# Patient Record
Sex: MALE | URBAN - METROPOLITAN AREA
[De-identification: names, ages, dates, MRNs, and addresses within clinical notes are randomized per-mention and may not be internally consistent; named-entity substitution may affect disease eponyms.]

---

## 2024-03-11 ENCOUNTER — LAB (OUTPATIENT)
Dept: URBAN - METROPOLITAN AREA CLINIC 98 | Facility: CLINIC | Age: 84
End: 2024-03-11

## 2024-03-11 ENCOUNTER — OV NP (OUTPATIENT)
Dept: URBAN - METROPOLITAN AREA CLINIC 98 | Facility: CLINIC | Age: 84
End: 2024-03-11
Payer: MEDICARE

## 2024-03-11 VITALS
HEART RATE: 84 BPM | TEMPERATURE: 96.8 F | HEIGHT: 71 IN | WEIGHT: 225 LBS | SYSTOLIC BLOOD PRESSURE: 101 MMHG | BODY MASS INDEX: 31.5 KG/M2 | DIASTOLIC BLOOD PRESSURE: 63 MMHG

## 2024-03-11 DIAGNOSIS — R74.8 ELEVATED LIVER ENZYMES: ICD-10-CM

## 2024-03-11 DIAGNOSIS — N18.30 CHRONIC RENAL FAILURE, STAGE 3 (MODERATE), UNSPECIFIED WHETHER STAGE 3A OR 3B CKD: ICD-10-CM

## 2024-03-11 PROBLEM — 90688005: Status: ACTIVE | Noted: 2024-03-11

## 2024-03-11 PROCEDURE — 99204 OFFICE O/P NEW MOD 45 MIN: CPT

## 2024-03-11 NOTE — HPI-TODAY'S VISIT:
Mr. Banks is an 83 year old male patient; referred to Dr. De Paz PCP Dr. Estee Cormier; progress note will be sent following visit. Visit 3/11/24 - Here to discuss elevated LFT - 2/23/24 > ESR 53, cr 2.29, alk 494, ast 55, alt 139, Tbil 0.21, GFR 28, , , Ferritin 1257, hgb 13.7, plt 202, inr 1.0, ggt 774,  - Dr. Priyanka Banks nephrologist- 6 months ago > CKD stage 3 - 6 ounces of ETOH daily for several years - Last ETOH 4 weeks ago - Increased fatigue - Denies swelling, jaundice - Send record to Dr. Fabrizio Sepulveda.

## 2024-03-14 ENCOUNTER — US (OUTPATIENT)
Dept: URBAN - METROPOLITAN AREA CLINIC 91 | Facility: CLINIC | Age: 84
End: 2024-03-14
Payer: MEDICARE

## 2024-03-14 DIAGNOSIS — N28.1 RENAL CYST: ICD-10-CM

## 2024-03-14 DIAGNOSIS — R93.2 ABNORMAL ULTRASOUND OF LIVER: ICD-10-CM

## 2024-03-14 PROCEDURE — 76700 US EXAM ABDOM COMPLETE: CPT | Performed by: INTERNAL MEDICINE

## 2024-03-22 LAB
A/G RATIO: 1.6
ACTIN (SMOOTH MUSCLE) ANTIBODY: 7
AFP, SERUM, TUMOR MARKER: 6.2
ALBUMIN: 4.5
ALKALINE PHOSPHATASE: 176
ALT (SGPT): 35
ANA DIRECT: NEGATIVE
AST (SGOT): 18
BASO (ABSOLUTE): 0
BASOS: 0
BILIRUBIN, TOTAL: 0.5
BUN/CREATININE RATIO: 12
BUN: 21
CALCIUM: 10.2
CARBON DIOXIDE, TOTAL: 23
CHLORIDE: 104
CREATININE: 1.78
EGFR: 38
EOS (ABSOLUTE): 0.2
EOS: 2
FERRITIN, SERUM: 746
GLOBULIN, TOTAL: 2.9
GLUCOSE: 78
HBSAG SCREEN: NEGATIVE
HEMATOCRIT: 42.6
HEMATOLOGY COMMENTS:: (no result)
HEMOGLOBIN: 14.4
HEP A AB, IGM: NEGATIVE
HEP A AB, TOTAL: POSITIVE
HEP C VIRUS AB: NON REACTIVE
HEPATITIS B SURF AB QUANT: <3.1
HEREDITARY  HEMOCHROMATOSIS: (no result)
IMMATURE CELLS: (no result)
IMMATURE GRANS (ABS): 0
IMMATURE GRANULOCYTES: 0
IMMUNOGLOBULIN A, QN, SERUM: 437
IMMUNOGLOBULIN E, TOTAL: 21
IMMUNOGLOBULIN G, QN, SERUM: 1307
IMMUNOGLOBULIN M, QN, SERUM: 117
INR: 1.1
IRON BIND.CAP.(TIBC): 310
IRON SATURATION: 30
IRON: 93
LYMPHS (ABSOLUTE): 2.9
LYMPHS: 33
Lab: (no result)
MCH: 32.1
MCHC: 33.8
MCV: 95
MITOCHONDRIAL (M2) ANTIBODY: <20
MONOCYTES(ABSOLUTE): 0.9
MONOCYTES: 11
NEUTROPHILS (ABSOLUTE): 4.7
NEUTROPHILS: 54
NRBC: (no result)
PLATELETS: 417
POTASSIUM: 5.3
PROTEIN, TOTAL: 7.4
PROTHROMBIN TIME: 11
RBC: 4.49
RDW: 13
SODIUM: 142
UIBC: 217
WBC: 8.8

## 2024-03-27 ENCOUNTER — LAB (OUTPATIENT)
Dept: URBAN - METROPOLITAN AREA CLINIC 98 | Facility: CLINIC | Age: 84
End: 2024-03-27

## 2024-03-27 ENCOUNTER — OV EP (OUTPATIENT)
Dept: URBAN - METROPOLITAN AREA CLINIC 98 | Facility: CLINIC | Age: 84
End: 2024-03-27

## 2024-03-27 ENCOUNTER — OV EP (OUTPATIENT)
Dept: URBAN - METROPOLITAN AREA CLINIC 98 | Facility: CLINIC | Age: 84
End: 2024-03-27
Payer: MEDICARE

## 2024-03-27 VITALS
HEART RATE: 79 BPM | SYSTOLIC BLOOD PRESSURE: 94 MMHG | HEIGHT: 71 IN | BODY MASS INDEX: 31.78 KG/M2 | DIASTOLIC BLOOD PRESSURE: 66 MMHG | WEIGHT: 227 LBS | TEMPERATURE: 97.2 F

## 2024-03-27 DIAGNOSIS — R74.8 ELEVATED LIVER ENZYMES: ICD-10-CM

## 2024-03-27 DIAGNOSIS — K76.0 HEPATIC STEATOSIS: ICD-10-CM

## 2024-03-27 DIAGNOSIS — N18.30 CHRONIC RENAL FAILURE, STAGE 3 (MODERATE), UNSPECIFIED WHETHER STAGE 3A OR 3B CKD: ICD-10-CM

## 2024-03-27 PROBLEM — 197321007: Status: ACTIVE | Noted: 2024-03-27

## 2024-03-27 PROCEDURE — 99213 OFFICE O/P EST LOW 20 MIN: CPT

## 2024-03-27 NOTE — HPI-TODAY'S VISIT:
Mr. Banks is an 83 year old male patient; referred to Dr. De Paz PCP Dr. Estee Cormier; progress note will be sent following visit. Visit 3/11/24- Rika Becker NP - Here to discuss elevated LFT - 2/23/24 > ESR 53, cr 2.29, alk 494, ast 55, alt 139, Tbil 0.21, GFR 28, , , Ferritin 1257, hgb 13.7, plt 202, inr 1.0, ggt 774,  - Dr. Priyanka Banks nephrologist- 6 months ago > CKD stage 3 - 6 ounces of ETOH daily for several years - Last ETOH 4 weeks ago - Increased fatigue - Denies swelling, jaundice - Send record to Dr. Fabrizio Sepulveda. Visit 3/27/24- Rika Becker NP - Here to follow-up after work-up for elevated LFTs - Abdominal U/S- mild fatty liver - Labs 3/11/24 > LFT returning to normal - Labs improved since stopping supplements - Between 2/23 and 3/11- decreased ETOH in half to 1 drink per day - Denies increased swellling , jaundice - Still has fatigue- unchanged

## 2024-05-25 LAB
A/G RATIO: 1.7
ALBUMIN: 4.3
ALKALINE PHOSPHATASE: 131
ALT (SGPT): 50
AST (SGOT): 17
BASO (ABSOLUTE): 0
BASOS: 1
BILIRUBIN, TOTAL: 0.5
BUN/CREATININE RATIO: 11
BUN: 23
CALCIUM: 9.5
CARBON DIOXIDE, TOTAL: 24
CHLORIDE: 106
CREATININE: 2.16
EGFR: 30
EOS (ABSOLUTE): 0.2
EOS: 2
GGT: 309
GLOBULIN, TOTAL: 2.5
GLUCOSE: 109
HEMATOCRIT: 42.4
HEMATOLOGY COMMENTS:: (no result)
HEMOGLOBIN: 14.5
IMMATURE CELLS: (no result)
IMMATURE GRANS (ABS): 0
IMMATURE GRANULOCYTES: 0
INR: 1
LYMPHS (ABSOLUTE): 2.8
LYMPHS: 38
MCH: 31.9
MCHC: 34.2
MCV: 93
MONOCYTES(ABSOLUTE): 0.9
MONOCYTES: 12
NEUTROPHILS (ABSOLUTE): 3.4
NEUTROPHILS: 47
NRBC: (no result)
PLATELETS: 221
POTASSIUM: 4.1
PROTEIN, TOTAL: 6.8
PROTHROMBIN TIME: 10.6
RBC: 4.54
RDW: 13.9
SODIUM: 143
WBC: 7.3

## 2024-06-11 ENCOUNTER — LAB OUTSIDE AN ENCOUNTER (OUTPATIENT)
Dept: URBAN - METROPOLITAN AREA CLINIC 98 | Facility: CLINIC | Age: 84
End: 2024-06-11

## 2024-06-11 ENCOUNTER — TELEPHONE ENCOUNTER (OUTPATIENT)
Dept: URBAN - METROPOLITAN AREA CLINIC 98 | Facility: CLINIC | Age: 84
End: 2024-06-11

## 2024-06-11 NOTE — HPI-TODAY'S VISIT:
Interpretation:  - FibroScan result estimated to be stage F2-F3 hepatic fibrosis* ; high variability in readings IQR/Med 23% would clinically correlate for validity  - severe hepatic steatosis is present based on Controlled Attenuation Parameter (CAP) reading   Plan:  - Follow up with the ordering physician as planned for plans  - Results e-faxed to the referring/ordering physician(s)   -------------------------------------------------------------------------------------------------------------------  Indication: 83 y.o. male with chronic liver disease due to chronic non-alcoholic fatty liver disease for the noninvasive assessment of liver fibrosis utilizing FibroScan transient elastography.     Exam Details:  The patient was brought to the procedure room after cleaning/disinfection of the FibroScan Vibration Controlled Transient Elastography (VCTE) equipment per protocol.  The patient fasted for > 3 hours prior to the procedure.  The test was explained fully to the patient and there was an opportunity to ask questions.  The patient elected to proceed.  Patient identification was verified and entered into the Fibroscan software.  The patient was placed in the supine position with the right arm in maximum abduction to allow optimal exposure to the right lateral rib cage area.  Fibroscan was performed per protocol using the M or XL probe (see scanned report).   A series of at least ten successful 50 mHz mechanical shear wave pulses were performed.  The patient tolerated the test well and was discharged without incident.  The printed Fibroscan results were interpreted by me and signed and scanned into the electronic medical record.    Results:  - median liver stiffness of 9.2 kPa consistent with F2-F3 stage hepatic fibrosis out of F4 (F4 is consistent with liver cirrhosis)*  - IQR/med was 23% (within expected range of <30%) with a >60% success rate  - Controlled Attenuation Parameter (CAP) was 319 dB/m which is consistent with severe hepatic steatosis  - CAP < 215 dB/m = no hepatic steatosis  - -300 = mild to moderate hepatic steatosis  - CAP > 300 dB/m = severe hepatic steatosis  - see Fibroscan result scanned into EMR (media tab) for full detailed results

## 2024-06-17 ENCOUNTER — OFFICE VISIT (OUTPATIENT)
Dept: URBAN - METROPOLITAN AREA CLINIC 98 | Facility: CLINIC | Age: 84
End: 2024-06-17

## 2024-06-17 ENCOUNTER — DASHBOARD ENCOUNTERS (OUTPATIENT)
Age: 84
End: 2024-06-17

## 2024-06-17 VITALS
TEMPERATURE: 97.2 F | HEART RATE: 66 BPM | HEIGHT: 71 IN | SYSTOLIC BLOOD PRESSURE: 120 MMHG | WEIGHT: 231.2 LBS | DIASTOLIC BLOOD PRESSURE: 69 MMHG | BODY MASS INDEX: 32.37 KG/M2

## 2024-06-17 NOTE — HPI-OTHER HISTORIES
Abdominal ultrasound 3/14/24 > Mild hepatic steatosis.   Interpretation: - FibroScan result estimated to be stage F2-F3 hepatic fibrosis* ; high variability in readings IQR/Med 23% would clinically correlate for validity - severe hepatic steatosis is present based on Controlled Attenuation Parameter (CAP) reading Plan: - Follow up with the ordering physician as planned for plans - Results e-faxed to the referring/ordering physician(s) ------------------------------------------------------------------------------------------------------------------- Indication: 83 y.o. male with chronic liver disease due to chronic non-alcoholic fatty liver disease for the noninvasive assessment of liver fibrosis utilizing FibroScan transient elastography. Exam Details: The patient was brought to the procedure room after cleaning/disinfection of the FibroScan Vibration Controlled Transient Elastography (VCTE) equipment per protocol. The patient fasted for > 3 hours prior to the procedure. The test was explained fully to the patient and there was an opportunity to ask questions. The patient elected to proceed. Patient identification was verified and entered into the Fibroscan software. The patient was placed in the supine position with the right arm in maximum abduction to allow optimal exposure to the right lateral rib cage area. Fibroscan was performed per protocol using the M or XL probe (see scanned report). A series of at least ten successful 50 mHz mechanical shear wave pulses were performed. The patient tolerated the test well and was discharged without incident. The printed Fibroscan results were interpreted by me and signed and scanned into the electronic medical record. Results: - median liver stiffness of 9.2 kPa consistent with F2-F3 stage hepatic fibrosis out of F4 (F4 is consistent with liver cirrhosis)* - IQR/med was 23% (within expected range of <30%) with a >60% success rate - Controlled Attenuation Parameter (CAP) was 319 dB/m which is consistent with severe hepatic steatosis - CAP < 215 dB/m = no hepatic steatosis - -300 = mild to moderate hepatic steatosis - CAP > 300 dB/m = severe hepatic steatosis - see Fibroscan result scanned into EMR (media tab) for full detailed results

## 2024-06-17 NOTE — HPI-TODAY'S VISIT:
Mr. Banks is an 83 year old male patient; referred to Dr. De Paz PCP Dr. Estee Cormier; progress note will be sent following visit. Visit 3/11/24- Rika Becker NP - Here to discuss elevated LFT - 2/23/24 > ESR 53, cr 2.29, alk 494, ast 55, alt 139, Tbil 0.21, GFR 28, , , Ferritin 1257, hgb 13.7, plt 202, inr 1.0, ggt 774,  - Dr. Priyanka Banks nephrologist- 6 months ago > CKD stage 3 - 6 ounces of ETOH daily for several years - Last ETOH 4 weeks ago - Increased fatigue - Denies swelling, jaundice - Send record to Dr. Fabrizio Sepulveda. Visit 3/27/24- Rika Becker NP - Here to follow-up after work-up for elevated LFTs - Abdominal U/S- mild fatty liver - Labs 3/11/24 > LFT returning to normal - Labs improved since stopping supplements - Between 2/23 and 3/11- decreased ETOH in half to 1 drink per day - Denies increased swellling , jaundice - Still has fatigue- unchanged 6/17/24- Rika Becker NP - Here to follow-up elevated LFT - ETOH 2 drinks per day - Labs improving - Stopped all supplements - Last visit nephrologist - no concerns

## 2024-09-02 ENCOUNTER — LAB OUTSIDE AN ENCOUNTER (OUTPATIENT)
Dept: URBAN - METROPOLITAN AREA CLINIC 98 | Facility: CLINIC | Age: 84
End: 2024-09-02

## 2024-11-18 ENCOUNTER — OFFICE VISIT (OUTPATIENT)
Dept: URBAN - METROPOLITAN AREA CLINIC 98 | Facility: CLINIC | Age: 84
End: 2024-11-18
Payer: MEDICARE

## 2024-11-18 DIAGNOSIS — N18.30 CHRONIC RENAL FAILURE, STAGE 3 (MODERATE), UNSPECIFIED WHETHER STAGE 3A OR 3B CKD: ICD-10-CM

## 2024-11-18 DIAGNOSIS — K76.0 HEPATIC STEATOSIS: ICD-10-CM

## 2024-11-18 DIAGNOSIS — R74.8 ELEVATED LIVER ENZYMES: ICD-10-CM

## 2024-11-18 PROCEDURE — 99213 OFFICE O/P EST LOW 20 MIN: CPT

## 2024-11-18 NOTE — HPI-TODAY'S VISIT:
Visit 3/11/24- Rika Becker NP Mr. Banks is an 83 year old male patient; referred to Dr. De Paz PCP Dr. Estee Cormier; progress note will be sent following visit. - Here to discuss elevated LFT - 2/23/24 > ESR 53, cr 2.29, alk 494, ast 55, alt 139, Tbil 0.21, GFR 28, , , Ferritin 1257, hgb 13.7, plt 202, inr 1.0, ggt 774,  - Dr. Priyanka Banks nephrologist- 6 months ago > CKD stage 3 - 6 ounces of ETOH daily for several years - Last ETOH 4 weeks ago - Increased fatigue - Denies swelling, jaundice - Send record to Dr. Fabrizio Sepulveda. Visit 3/27/24- Rika Becker NP - Here to follow-up after work-up for elevated LFTs - Abdominal U/S- mild fatty liver - Labs 3/11/24 > LFT returning to normal - Labs improved since stopping supplements - Between 2/23 and 3/11- decreased ETOH in half to 1 drink per day - Denies increased swellling , jaundice - Still has fatigue- unchanged 6/17/24- Rika Becker NP - Here to follow-up elevated LFT - Wife here for OV today - ETOH 2 drinks per day - Labs improving - Stopped all supplements - Last visit nephrologist - no concerns 11/18/24- Rika Becker NP - 85 yo male here to follow-up fatty liver - spouse here for OV Navi - Has decreased etoh 1 every other day - Labs improved with decreased etoh - Has appt. with Dr. Priyanka Banks, nephrologist 12/2024 to discuss kidney disease - Hepatitis panel- negative; autoimmune markers- normal -FibroScan F2-F3- continue to decrease ETOH, no supplements or NSAIDs

## 2024-11-18 NOTE — HPI-OTHER HISTORIES
Abdominal ultrasound 3/14/24 > Mild hepatic steatosis. 6/7/2024 FIBROSCAN   Interpretation: - FibroScan result estimated to be stage F2-F3 hepatic fibrosis* ; high variability in readings IQR/Med 23% would clinically correlate for validity - severe hepatic steatosis is present based on Controlled Attenuation Parameter (CAP) reading Plan: - Follow up with the ordering physician as planned for plans - Results e-faxed to the referring/ordering physician(s) ------------------------------------------------------------------------------------------------------------------- Indication: 83 y.o. male with chronic liver disease due to chronic non-alcoholic fatty liver disease for the noninvasive assessment of liver fibrosis utilizing FibroScan transient elastography. Exam Details: The patient was brought to the procedure room after cleaning/disinfection of the FibroScan Vibration Controlled Transient Elastography (VCTE) equipment per protocol. The patient fasted for > 3 hours prior to the procedure. The test was explained fully to the patient and there was an opportunity to ask questions. The patient elected to proceed. Patient identification was verified and entered into the Fibroscan software. The patient was placed in the supine position with the right arm in maximum abduction to allow optimal exposure to the right lateral rib cage area. Fibroscan was performed per protocol using the M or XL probe (see scanned report). A series of at least ten successful 50 mHz mechanical shear wave pulses were performed. The patient tolerated the test well and was discharged without incident. The printed Fibroscan results were interpreted by me and signed and scanned into the electronic medical record. Results: - median liver stiffness of 9.2 kPa consistent with F2-F3 stage hepatic fibrosis out of F4 (F4 is consistent with liver cirrhosis)* - IQR/med was 23% (within expected range of <30%) with a >60% success rate - Controlled Attenuation Parameter (CAP) was 319 dB/m which is consistent with severe hepatic steatosis - CAP < 215 dB/m = no hepatic steatosis - -300 = mild to moderate hepatic steatosis - CAP > 300 dB/m = severe hepatic steatosis - see Fibroscan result scanned into EMR (media tab) for full detailed results

## 2024-11-26 ENCOUNTER — TELEPHONE ENCOUNTER (OUTPATIENT)
Dept: URBAN - METROPOLITAN AREA CLINIC 23 | Facility: CLINIC | Age: 84
End: 2024-11-26

## 2025-04-07 ENCOUNTER — LAB OUTSIDE AN ENCOUNTER (OUTPATIENT)
Dept: URBAN - METROPOLITAN AREA CLINIC 98 | Facility: CLINIC | Age: 85
End: 2025-04-07

## 2025-05-20 ENCOUNTER — OFFICE VISIT (OUTPATIENT)
Dept: URBAN - METROPOLITAN AREA CLINIC 98 | Facility: CLINIC | Age: 85
End: 2025-05-20

## 2025-05-20 NOTE — HPI-TODAY'S VISIT:
Visit 3/11/24- Rika Becker NP Mr. Banks is an 83 year old male patient; referred to Dr. De Paz PCP Dr. Estee Cormier; progress note will be sent following visit. - Here to discuss elevated LFT - 2/23/24 > ESR 53, cr 2.29, alk 494, ast 55, alt 139, Tbil 0.21, GFR 28, , , Ferritin 1257, hgb 13.7, plt 202, inr 1.0, ggt 774,  - Dr. Priyanka Banks nephrologist- 6 months ago > CKD stage 3 - 6 ounces of ETOH daily for several years - Last ETOH 4 weeks ago - Increased fatigue - Denies swelling, jaundice - Send record to Dr. Fabrizio Sepulveda. Visit 3/27/24- iRka Becker NP - Here to follow-up after work-up for elevated LFTs - Abdominal U/S- mild fatty liver - Labs 3/11/24 > LFT returning to normal - Labs improved since stopping supplements - Between 2/23 and 3/11- decreased ETOH in half to 1 drink per day - Denies increased swellling , jaundice - Still has fatigue- unchanged 6/17/24- Rika Becker NP - Here to follow-up elevated LFT - Wife here for OV today - ETOH 2 drinks per day - Labs improving - Stopped all supplements - Last visit nephrologist - no concerns 11/18/24- Rika Becker NP - 85 yo male here to follow-up fatty liver - spouse here for OV Mitul - Has decreased etoh 1 every other day - Labs improved with decreased etoh - Has appt. with Dr. Priyanka Banks, nephrologist 12/2024 to discuss kidney disease - Hepatitis panel- negative; autoimmune markers- normal -FibroScan F2-F3- continue to decrease ETOH, no supplements or NSAIDs  5/20/25- Rika Becker NP - 85 yo male here for scheduled follow-up fatty liver, elevated lft - spouse mitul here for ov - Still drinking 1 drink etoh per day - no supplements - tramadol daily for pain- fell - kidney function worse

## 2025-07-10 ENCOUNTER — OFFICE VISIT (OUTPATIENT)
Dept: URBAN - METROPOLITAN AREA TELEHEALTH 2 | Facility: TELEHEALTH | Age: 85
End: 2025-07-10
Payer: MEDICARE

## 2025-07-10 ENCOUNTER — TELEPHONE ENCOUNTER (OUTPATIENT)
Dept: URBAN - METROPOLITAN AREA CLINIC 3 | Facility: CLINIC | Age: 85
End: 2025-07-10

## 2025-07-10 DIAGNOSIS — N18.30 CHRONIC RENAL FAILURE, STAGE 3 (MODERATE), UNSPECIFIED WHETHER STAGE 3A OR 3B CKD: ICD-10-CM

## 2025-07-10 DIAGNOSIS — R74.8 ELEVATED LIVER ENZYMES: ICD-10-CM

## 2025-07-10 DIAGNOSIS — K76.0 HEPATIC STEATOSIS: ICD-10-CM

## 2025-07-10 PROCEDURE — 99213 OFFICE O/P EST LOW 20 MIN: CPT

## 2025-07-10 NOTE — HPI-TODAY'S VISIT:
7/10/25- Rika Becker, NP - 83 yo male present for telehealth to discuss labs MetALD - Drinking 1-2 alcoholic drinks per day - kidney function slight improvement - Nephrologist Dr. Priyanka Banks- leaving practice.  - No concerns today

## 2025-07-10 NOTE — HPI-OTHER HISTORIES
Visit 3/11/24- Rika Becker NP Mr. Banks is an 83 year old male patient; referred to Dr. De Paz PCP Dr. Estee Cormier; progress note will be sent following visit. - Here to discuss elevated LFT - 2/23/24 > ESR 53, cr 2.29, alk 494, ast 55, alt 139, Tbil 0.21, GFR 28, , , Ferritin 1257, hgb 13.7, plt 202, inr 1.0, ggt 774,  - Dr. Priyanka Banks nephrologist- 6 months ago > CKD stage 3 - 6 ounces of ETOH daily for several years - Last ETOH 4 weeks ago - Increased fatigue - Denies swelling, jaundice - Send record to Dr. Fabrizio Sepulveda. Visit 3/27/24- Rika Becker NP - Here to follow-up after work-up for elevated LFTs - Abdominal U/S- mild fatty liver - Labs 3/11/24 > LFT returning to normal - Labs improved since stopping supplements - Between 2/23 and 3/11- decreased ETOH in half to 1 drink per day - Denies increased swellling , jaundice - Still has fatigue- unchanged 6/17/24- Rika Becker NP - Here to follow-up elevated LFT - Wife here for OV today - ETOH 2 drinks per day - Labs improving - Stopped all supplements - Last visit nephrologist - no concerns 11/18/24- Rika Becker NP - 85 yo male here to follow-up fatty liver - spouse here for OV Mitul - Has decreased etoh 1 every other day - Labs improved with decreased etoh - Has appt. with Dr. Priyanka Banks, nephrologist 12/2024 to discuss kidney disease - Hepatitis panel- negative; autoimmune markers- normal -FibroScan F2-F3- continue to decrease ETOH, no supplements or NSAIDs  Abdominal ultrasound 3/14/24 > Mild hepatic steatosis. 6/7/2024 FIBROSCAN   Interpretation: - FibroScan result estimated to be stage F2-F3 hepatic fibrosis*   5/20/25- Rika Becker NP - 85 yo male here for scheduled follow-up fatty liver, elevated lft - spouse mitul here for ov - Still drinking 1 drink etoh per day - no supplements - tramadol daily for pain- fell - kidney function worse